# Patient Record
(demographics unavailable — no encounter records)

---

## 2024-10-10 NOTE — PHYSICAL EXAM
[0] : left 0 [de-identified] : NAD. well appearing  [FreeTextEntry1] : right leg ecchymosis s/p angiogram. Ecchymosis of right calf and foot

## 2024-10-10 NOTE — ASSESSMENT
[FreeTextEntry1] : 70 yo female s/p RLE angiogram with SFA stenting with shockwave, popliteal artery stenting, TPT stenting, anterior tibial artery angioplasty For peripheral arterial disease with right foot rest pain and numbness. Rest pain and calf pain resolved since procedure.   Pt counseled on procedure performed. Counseled ecchymosis after surgery is normal and will improve  Pt counseled to continue ASA and Plavix, sent refills  Plan for 2 week ROGELIO/PVR  A total of 30 minutes was spent with patient and coordinating care

## 2024-10-10 NOTE — HISTORY OF PRESENT ILLNESS
[FreeTextEntry1] : 9/20/24: 72 yo female PMHx HTN, presents with RLE rest pain and numbness for the past month. Pt states she started with right leg cramping when walking and then it progressed to pain when resting for the past month. She denies any wounds on the feet. She is a current smoker.   10/10/24: Pt is s/p RLE angio with intervention. She has some ecchymosis of right lower leg since procedure. She no longer has right calf cramping. She still has numbness in her right foot. She has been compliant with ASA and Plavix.   PSHx:  9/26/24 RLE angiogram with SFA stenting with shockwave, popliteal artery stenting, TPT stenting, anterior tibial artery angioplasty

## 2024-10-31 NOTE — ASSESSMENT
[FreeTextEntry1] : 72 yo female s/p RLE angiogram with SFA stenting with shockwave, popliteal artery stenting, TPT stenting, anterior tibial artery angioplasty For peripheral arterial disease with right foot rest pain and numbness. Rest pain and calf pain resolved since procedure. ROGELIO/PVR today is normal.   Pt counseled on results of ROGELIO/PVR and above diagnosis. Pt counseled to continue ASA and Plavix Plan for 3 months for repeat ROGELIO/PVR  A total of 30 minutes was spent with patient and coordinating care

## 2024-10-31 NOTE — HISTORY OF PRESENT ILLNESS
[FreeTextEntry1] : 9/20/24: 72 yo female PMHx HTN, presents with RLE rest pain and numbness for the past month. Pt states she started with right leg cramping when walking and then it progressed to pain when resting for the past month. She denies any wounds on the feet. She is a current smoker.   10/10/24: Pt is s/p RLE angio with intervention. She has some ecchymosis of right lower leg since procedure. She no longer has right calf cramping. She still has numbness in her right foot. She has been compliant with ASA and Plavix.   10/31/24: Pt is s/p RLE angio with intervention. She no longer has right calf cramping. She still has numbness in her right foot. She has been compliant with ASA and Plavix.   PSHx:  9/26/24 RLE angiogram with SFA stenting with shockwave, popliteal artery stenting, TPT stenting, anterior tibial artery angioplasty

## 2025-01-31 NOTE — ASSESSMENT
[FreeTextEntry1] : 72 yo female s/p RLE angiogram with SFA stenting with shockwave, popliteal artery stenting, TPT stenting, anterior tibial artery angioplasty For peripheral arterial disease with right foot rest pain and numbness. Rest pain and calf pain resolved since procedure. ROGELIO/PVR today is normal.   Pt counseled on results of ROGELIO/PVR and above diagnosis. Pt counseled to continue ASA and Plavix Start gabapentin 300 mg at bedtime for neuropathy  Plan for 3 months for repeat ROGELIO/PVR  A total of 30 minutes was spent with patient and coordinating care

## 2025-01-31 NOTE — HISTORY OF PRESENT ILLNESS
[FreeTextEntry1] : 9/20/24: 70 yo female PMHx HTN, presents with RLE rest pain and numbness for the past month. Pt states she started with right leg cramping when walking and then it progressed to pain when resting for the past month. She denies any wounds on the feet. She is a current smoker.   10/10/24: Pt is s/p RLE angio with intervention. She has some ecchymosis of right lower leg since procedure. She no longer has right calf cramping. She still has numbness in her right foot. She has been compliant with ASA and Plavix.   10/31/24: Pt is s/p RLE angio with intervention. She no longer has right calf cramping. She still has numbness in her right foot. She has been compliant with ASA and Plavix.   1/30/25: Pt is s/p RLE angio with intervention. She no longer has right calf cramping. She still has numbness in her right foot. She has been compliant with ASA and Plavix.   PSHx:  9/26/24 RLE angiogram with SFA stenting with shockwave, popliteal artery stenting, TPT stenting, anterior tibial artery angioplasty

## 2025-01-31 NOTE — PROCEDURE
[FreeTextEntry1] : 10/31/24 ROGELIO/PVR: right 1.19, left 1.13  1/30/25 ROGELIO/PVR: right 1.0, left 1.19

## 2025-04-03 NOTE — PROCEDURE
[FreeTextEntry1] : 10/31/24 ROGELIO/PVR: right 1.19, left 1.13  1/30/25 ROGELIO/PVR: right 1.0, left 1.19  4/3/25 ROGELIO/PVR: right 0.51, left 1.10  4/3/25 RLE arterial duplex: there is a >75% distal SFA just past the stent end and proximal pop A.  distal pop stent is occluded, prox pta and fara occluded. mid/distal pta/fara cynthia dpa patent.

## 2025-04-03 NOTE — HISTORY OF PRESENT ILLNESS
[FreeTextEntry1] : 9/20/24: 72 yo female PMHx HTN, presents with RLE rest pain and numbness for the past month. Pt states she started with right leg cramping when walking and then it progressed to pain when resting for the past month. She denies any wounds on the feet. She is a current smoker.   10/10/24: Pt is s/p RLE angio with intervention. She has some ecchymosis of right lower leg since procedure. She no longer has right calf cramping. She still has numbness in her right foot. She has been compliant with ASA and Plavix.   10/31/24: Pt is s/p RLE angio with intervention. She no longer has right calf cramping. She still has numbness in her right foot. She has been compliant with ASA and Plavix.   1/30/25: Pt is s/p RLE angio with intervention. She no longer has right calf cramping. She still has numbness in her right foot. She has been compliant with ASA and Plavix.   4/3/25: Pt states over the past few weeks she has had increased RLE pain. She feels the pain in right calf when walking about 20 feet. She is complaint with ASA and Plavix, states she has not missed a dose.   PSHx:  9/26/24 RLE angiogram with SFA stenting with shockwave, popliteal artery stenting, TPT stenting, anterior tibial artery angioplasty

## 2025-04-03 NOTE — ASSESSMENT
[FreeTextEntry1] : 70 yo female with occluded right Popliteal artery stent. Drop in ROGELIO from 1.0 to 0.51 on right.    Pt counseled on results of duplex and above diagnosis. Pt counseled to continue ASA and Plavix Continue gabapentin 300 mg at bedtime for neuropathy  Plan for RLE angiogram with possible intervention. Discussed the risks and benefits of the procedure with the patient. All questions answered.  Medication instructions for angiogram:  Can continue all medications

## 2025-04-24 NOTE — ASSESSMENT
[FreeTextEntry1] : 70 yo female s/p RLE angiogram with popliteal artery stenting. Pt has less claudication since surgery   Pt counseled on results of angiogram  Pt counseled to continue ASA and Plavix Continue gabapentin 300 mg at bedtime for neuropathy  RTC in 6 weeks for ROGELIO/PVR

## 2025-04-24 NOTE — HISTORY OF PRESENT ILLNESS
[FreeTextEntry1] : 9/20/24: 72 yo female PMHx HTN, presents with RLE rest pain and numbness for the past month. Pt states she started with right leg cramping when walking and then it progressed to pain when resting for the past month. She denies any wounds on the feet. She is a current smoker.   10/10/24: Pt is s/p RLE angio with intervention. She has some ecchymosis of right lower leg since procedure. She no longer has right calf cramping. She still has numbness in her right foot. She has been compliant with ASA and Plavix.   10/31/24: Pt is s/p RLE angio with intervention. She no longer has right calf cramping. She still has numbness in her right foot. She has been compliant with ASA and Plavix.   1/30/25: Pt is s/p RLE angio with intervention. She no longer has right calf cramping. She still has numbness in her right foot. She has been compliant with ASA and Plavix.   4/3/25: Pt states over the past few weeks she has had increased RLE pain. She feels the pain in right calf when walking about 20 feet. She is complaint with ASA and Plavix, states she has not missed a dose.   4/24/25: Pt is s/p RLE angiogram with popliteal artery stenting. She is doing well. She no longer has claudication. She is complaint with ASA and Plavix.   PSHx:  4/10/25 RLE angiogram with popliteal artery stenting  9/26/24 RLE angiogram with SFA stenting with shockwave, popliteal artery stenting, TPT stenting, anterior tibial artery angioplasty